# Patient Record
Sex: MALE | Race: WHITE
[De-identification: names, ages, dates, MRNs, and addresses within clinical notes are randomized per-mention and may not be internally consistent; named-entity substitution may affect disease eponyms.]

---

## 2018-08-29 ENCOUNTER — HOSPITAL ENCOUNTER (INPATIENT)
Dept: HOSPITAL 92 - SJJU | Age: 83
LOS: 8 days | Discharge: NURSING, REHAB OR LONG-TERM CARE | DRG: 467 | End: 2018-09-06
Attending: ORTHOPAEDIC SURGERY | Admitting: ORTHOPAEDIC SURGERY
Payer: MEDICARE

## 2018-08-29 VITALS — BODY MASS INDEX: 25.7 KG/M2

## 2018-08-29 DIAGNOSIS — N17.9: ICD-10-CM

## 2018-08-29 DIAGNOSIS — I48.2: ICD-10-CM

## 2018-08-29 DIAGNOSIS — D50.0: ICD-10-CM

## 2018-08-29 DIAGNOSIS — K59.00: ICD-10-CM

## 2018-08-29 DIAGNOSIS — M10.9: ICD-10-CM

## 2018-08-29 DIAGNOSIS — M00.9: ICD-10-CM

## 2018-08-29 DIAGNOSIS — N18.9: ICD-10-CM

## 2018-08-29 DIAGNOSIS — T84.53XA: Primary | ICD-10-CM

## 2018-08-29 DIAGNOSIS — I12.9: ICD-10-CM

## 2018-08-29 DIAGNOSIS — B95.5: ICD-10-CM

## 2018-08-29 DIAGNOSIS — E87.1: ICD-10-CM

## 2018-08-29 LAB
ANION GAP SERPL CALC-SCNC: 14 MMOL/L (ref 10–20)
ANISOCYTOSIS BLD QL SMEAR: (no result) (100X)
APTT PPP: 44.4 SEC (ref 22.9–36.1)
BUN SERPL-MCNC: 20 MG/DL (ref 8.4–25.7)
CALCIUM SERPL-MCNC: 9 MG/DL (ref 7.8–10.44)
CHLORIDE SERPL-SCNC: 100 MMOL/L (ref 98–107)
CO2 SERPL-SCNC: 26 MMOL/L (ref 23–31)
CREAT CL PREDICTED SERPL C-G-VRATE: 77 ML/MIN (ref 70–130)
CRYSTAL-AUWI FLAG: 1 (ref 0–15)
GLUCOSE SERPL-MCNC: 113 MG/DL (ref 83–110)
HEV IGM SER QL: 2 (ref 0–7.99)
HGB BLD-MCNC: 11.4 G/DL (ref 14–18)
HYALINE CASTS #/AREA URNS LPF: (no result) LPF
INR PPP: 1.9
MCH RBC QN AUTO: 29.6 PG (ref 27–31)
MCV RBC AUTO: 90.5 FL (ref 78–98)
MDIFF COMPLETE?: YES
NEUTROPHILS NFR FLD: 91 %
PATHC CAST-AUWI FLAG: 0.58 (ref 0–2.49)
PLATELET # BLD AUTO: 354 THOU/UL (ref 130–400)
PLATELET BLD QL SMEAR: (no result)
POTASSIUM SERPL-SCNC: 4.1 MMOL/L (ref 3.5–5.1)
PROTHROMBIN TIME: 21.6 SEC (ref 12–14.7)
RBC # BLD AUTO: 3.84 MILL/UL (ref 4.7–6.1)
RBC # FLD AUTO: (no result) /CUMM
RBC BACKGROUND COUNT: 0
RBC UR QL AUTO: (no result) HPF (ref 0–3)
SODIUM SERPL-SCNC: 136 MMOL/L (ref 136–145)
SP GR UR STRIP: 1.02 (ref 1–1.04)
SPERM-AUWI FLAG: 0 (ref 0–9.9)
WBC # BLD AUTO: 13.1 THOU/UL (ref 4.8–10.8)
WBC # FLD AUTO: (no result) /CUMM
WBC BACKGROUND COUNT: 0.01
WBC UR QL AUTO: (no result) HPF (ref 0–3)
YEAST-AUWI FLAG: 0 (ref 0–25)

## 2018-08-29 PROCEDURE — 93005 ELECTROCARDIOGRAM TRACING: CPT

## 2018-08-29 PROCEDURE — 86140 C-REACTIVE PROTEIN: CPT

## 2018-08-29 PROCEDURE — 87070 CULTURE OTHR SPECIMN AEROBIC: CPT

## 2018-08-29 PROCEDURE — 87077 CULTURE AEROBIC IDENTIFY: CPT

## 2018-08-29 PROCEDURE — C1751 CATH, INF, PER/CENT/MIDLINE: HCPCS

## 2018-08-29 PROCEDURE — 85060 BLOOD SMEAR INTERPRETATION: CPT

## 2018-08-29 PROCEDURE — 93010 ELECTROCARDIOGRAM REPORT: CPT

## 2018-08-29 PROCEDURE — 89051 BODY FLUID CELL COUNT: CPT

## 2018-08-29 PROCEDURE — A4216 STERILE WATER/SALINE, 10 ML: HCPCS

## 2018-08-29 PROCEDURE — 81001 URINALYSIS AUTO W/SCOPE: CPT

## 2018-08-29 PROCEDURE — 85025 COMPLETE CBC W/AUTO DIFF WBC: CPT

## 2018-08-29 PROCEDURE — C1776 JOINT DEVICE (IMPLANTABLE): HCPCS

## 2018-08-29 PROCEDURE — 36569 INSJ PICC 5 YR+ W/O IMAGING: CPT

## 2018-08-29 PROCEDURE — 36415 COLL VENOUS BLD VENIPUNCTURE: CPT

## 2018-08-29 PROCEDURE — 87186 SC STD MICRODIL/AGAR DIL: CPT

## 2018-08-29 PROCEDURE — 85730 THROMBOPLASTIN TIME PARTIAL: CPT

## 2018-08-29 PROCEDURE — 80048 BASIC METABOLIC PNL TOTAL CA: CPT

## 2018-08-29 PROCEDURE — 71046 X-RAY EXAM CHEST 2 VIEWS: CPT

## 2018-08-29 PROCEDURE — 76770 US EXAM ABDO BACK WALL COMP: CPT

## 2018-08-29 PROCEDURE — C1713 ANCHOR/SCREW BN/BN,TIS/BN: HCPCS

## 2018-08-29 PROCEDURE — 87205 SMEAR GRAM STAIN: CPT

## 2018-08-29 PROCEDURE — 85610 PROTHROMBIN TIME: CPT

## 2018-08-29 PROCEDURE — 80202 ASSAY OF VANCOMYCIN: CPT

## 2018-08-29 RX ADMIN — DOCUSATE CALCIUM SCH MG: 240 CAPSULE, LIQUID FILLED ORAL at 20:26

## 2018-08-29 RX ADMIN — HYDROCODONE BITARTRATE AND ACETAMINOPHEN PRN TAB: 10; 325 TABLET ORAL at 20:26

## 2018-08-29 RX ADMIN — CEFTRIAXONE SCH MLS: 2 INJECTION, POWDER, FOR SOLUTION INTRAMUSCULAR; INTRAVENOUS at 21:09

## 2018-08-29 NOTE — RAD
CHEST TWO VIEWS:

8/29/18

 

HISTORY: 

Preop. 

 

FINDINGS:  

Cardiac silhouette and pulmonary vasculature are unremarkable. Mediastinum is midline with aortic taylor
cification. Lungs are hyperinflated with flattening of each hemidiaphragm. No lobar consolidation, pn
eumothorax or pleural fluid. Marked elevation of the right hemidiaphragm with osteoarthritic type [place]
nges at the acromiohumeral joint. Ossification of the anterior longitudinal ligament of the thoracic 
spine is consistent with diffuse idiopathic skeletal hyperostosis. 

 

IMPRESSION:  

Atherosclerosis. 

 

COPD.

 

Chronic right rotator cuff tear. 

 

 

 

POS: [facility]

## 2018-08-30 RX ADMIN — DOCUSATE CALCIUM SCH MG: 240 CAPSULE, LIQUID FILLED ORAL at 20:10

## 2018-08-30 RX ADMIN — VANCOMYCIN HYDROCHLORIDE SCH MLS: 1 INJECTION, POWDER, LYOPHILIZED, FOR SOLUTION INTRAVENOUS at 18:36

## 2018-08-30 RX ADMIN — CEFTRIAXONE SCH MLS: 2 INJECTION, POWDER, FOR SOLUTION INTRAMUSCULAR; INTRAVENOUS at 20:10

## 2018-08-30 RX ADMIN — HYDROCODONE BITARTRATE AND ACETAMINOPHEN PRN TAB: 10; 325 TABLET ORAL at 20:08

## 2018-08-30 RX ADMIN — HYDROCODONE BITARTRATE AND ACETAMINOPHEN PRN TAB: 10; 325 TABLET ORAL at 11:26

## 2018-08-30 RX ADMIN — Medication SCH MG: at 08:42

## 2018-08-30 RX ADMIN — DOCUSATE CALCIUM SCH MG: 240 CAPSULE, LIQUID FILLED ORAL at 08:41

## 2018-08-30 RX ADMIN — Medication SCH: at 20:11

## 2018-08-30 RX ADMIN — Medication SCH MG: at 20:07

## 2018-08-30 NOTE — PDOC.PN
- Subjective


Encounter Start Date: 08/30/18


Encounter Start Time: 15:21


Subjective: IM team consulted for medical management


-: pt reports that he fell & has worse Knee pain in R knee





- Objective


MAR Reviewed: Yes


Vital Signs & Weight: 


 Vital Signs (12 hours)











  Temp Pulse Resp BP BP Pulse Ox


 


 08/30/18 13:05  98.3 F  70  16   102/60  98


 


 08/30/18 08:42  98.6 F  84  18    98


 


 08/30/18 08:41   84   123/71  


 


 08/30/18 07:10  98.6 F  84  16   123/71  98


 


 08/30/18 04:05  97.9 F  96  16   118/67  96








 Weight











Weight                         200 lb














I&O: 


 











 08/29/18 08/30/18 08/31/18





 06:59 06:59 06:59


 


Intake Total  1040 


 


Output Total  500 


 


Balance  540 











Result Diagrams: 


 08/29/18 18:17





 08/29/18 18:17


Additional Labs: 





 Laboratory Tests











  08/30/18





  07:32


 


C-Reactive Protein  24.63 H














Phys Exam





- Physical Examination


Constitutional: NAD


HEENT: PERRLA, moist MMs, sclera anicteric, oral pharynx no lesions


Neck: no nodes, no JVD, supple, full ROM


Respiratory: no wheezing, no rales, no rhonchi, clear to auscultation bilateral


Cardiovascular: RRR, no significant murmur, no rub


Gastrointestinal: soft, non-tender, no distention, positive bowel sounds


Musculoskeletal: no edema, pulses present


R knee sweling


Neurological: non-focal, normal sensation, moves all 4 limbs


Psychiatric: normal affect, A&O x 3


Skin: no rash





Dx/Plan


(1) Knee pain


Code(s): M25.569 - PAIN IN UNSPECIFIED KNEE   Status: Acute   


Qualifiers: 


   Laterality: right 


Comment: Suspecting septic arthritis   





(2) A-fib


Code(s): I48.91 - UNSPECIFIED ATRIAL FIBRILLATION   Status: Chronic   


Qualifiers: 


   Atrial fibrillation type: paroxysmal   Qualified Code(s): I48.0 - Paroxysmal 

atrial fibrillation   





(3) HTN (hypertension)


Code(s): I10 - ESSENTIAL (PRIMARY) HYPERTENSION   Status: Chronic   





(4) Gout


Code(s): M10.9 - GOUT, UNSPECIFIED   Status: Chronic   





- Plan


continue antibiotics, DVT proph w/SCDs


Abx per ID. on Vanco and rocephin.Synovial fluid Cx sent.follow 


-: BP meds resterted


-: hold eliquis for Sx tomorrow.restart as soon as possible


-: add PRN meds.


-: Im team will follow. Cont NS. am labs





* .








Review of Systems





- Review of Systems


Constitutional: negative: fever, chills, sweats, weakness, malaise, other


ENT: negative: Ear Pain, Ear Discharge, Nose Pain, Nose Discharge, Nose 

Congestion, Mouth Pain, Mouth Swelling, Throat Pain, Throat Swelling, Other


Respiratory: negative: Cough, Dry, Shortness of Breath, Hemoptysis, SOB with 

Excertion, Pleuritic Pain, Sputum, Wheezing


Cardiovascular: negative: chest pain, palpitations, orthopnea, paroxysmal 

nocturnal dyspnea, edema, light headedness, other


Gastrointestinal: negative: Nausea, Vomiting, Abdominal Pain, Diarrhea, 

Constipation, Melena, Hematochezia, Other


Genitourinary: negative: Dysuria, Frequency, Incontinence, Hematuria, Retention

, Other


Musculoskeletal: Other (Knee pain).  negative: Neck Pain, Shoulder Pain, Arm 

Pain, Back Pain, Hand Pain, Leg Pain, Foot Pain


Skin: negative: Rash, Lesions, Alec, Bruising, Other


Neurological: negative: Weakness, Numbness, Incoordination, Change in Speech, 

Confusion, Seizures, Other





- Medications/Allergies


Allergies/Adverse Reactions: 


 Allergies











Allergy/AdvReac Type Severity Reaction Status Date / Time


 


No Known Allergies Allergy   Verified 08/29/18 21:16











Medications: 


 Current Medications





Hydrocodone Bitart/Acetaminophen (Norco 10/325)  1 tab PO Q6H PRN


   PRN Reason: Mild-Moderate Pain (1-5)


   Last Admin: 08/29/18 20:26 Dose:  1 tab


Hydrocodone Bitart/Acetaminophen (Norco 10/325)  2 tab PO Q6H PRN


   PRN Reason: Moderate to Severe Pain (6-10)


   Last Admin: 08/30/18 11:26 Dose:  2 tab


Allopurinol (Zyloprim)  300 mg PO HS [facility]


Amlodipine Besylate (Norvasc)  10 mg PO DAILY [facility]


Ascorbic Acid (Vitamin C)  1,000 mg PO BID [facility]


   Last Admin: 08/30/18 08:42 Dose:  1,000 mg


Atorvastatin Calcium (Lipitor)  40 mg PO HS [facility]


Colchicine (Colcrys)  0.6 mg PO DAILY [facility]


Docusate Calcium (Surfak)  240 mg PO BID [facility]


   Last Admin: 08/30/18 08:41 Dose:  240 mg


Enalapril Maleate (Vasotec)  20 mg PO HS [facility]


Fish Oil (Fish Oil)  1,000 mg PO BID [facility]


   Last Admin: 08/30/18 08:41 Dose:  1,000 mg


Sodium Chloride (Normal Saline 0.9%)  1,000 mls @ 50 mls/hr IV .Q20H [facility]


   Last Admin: 08/29/18 18:29 Dose:  1,000 mls


Ceftriaxone Sodium 2 gm/ (Sodium Chloride)  100 mls @ 200 mls/hr IVPB Q24HR@

2000 [facility]


   Last Admin: 08/29/18 21:09 Dose:  100 mls


Pantoprazole Sodium (Protonix)  40 mg PO DAILY [facility]


Sodium Chloride (Flush - Normal Saline)  10 ml IVF Q12HR [facility]


Sodium Chloride (Flush - Normal Saline)  10 ml IVF PRN PRN


   PRN Reason: Saline Flush


Tamsulosin HCl (Flomax)  0.4 mg PO DAILY [facility]

## 2018-08-30 NOTE — CON
DATE OF CONSULTATION:  08/30/2018

 

REASON FOR CONSULTATION:  Right total knee replacement infection.

 

HISTORY OF PRESENT ILLNESS:  An 85-year-old who has a history of chronic atrial 
fibrillation, hypertension as well as gout, and previous right knee replacement 
many years ago by Dr. Boone.  Reportedly, more than 20 years ago, he developed 
progressive pain in the area and Dr. Boone did arthrocentesis.  This yielded 
seropurulent fluid with gram positive cocci in clusters.  Full identification 
and susceptibilities are pending.  The patient is going to have removal of the 
implant tomorrow and we will have a spacer, probably functional spacer 
temporarily placed.  He right now denies any headaches, visual symptoms, sore 
throat, odynophagia, excisional dyspnea or chest pain, no back pain, no 
abdominal pain or diarrhea.  He is voiding in the urinal.  His neuro status is 
stable.

 

PAST MEDICAL HISTORY:  Includes hypertension, gout, atrial fibrillation.

 

MEDICATIONS:  Allopurinol, hydrocodone, Norvasc, ascorbic acid, colchicine, 
enalapril, ceftriaxone, tamsulosin and currently in addition, he is on tramadol.

 

ALLERGIES:  None.

 

SOCIAL HISTORY:  Never a smoker.

 

FAMILY HISTORY:  Noncontributory.

 

PHYSICAL EXAMINATION:

VITAL SIGNS:  Temperature max 98.6, BP 95/56, pulse 72, respirations 20, O2 sat 
98%.

SKIN:  Shows the right knee with no evidence of erythema or drainage.  
Peripheral IV access.  Does not have a Reddy catheter.

HEENT:  Noncontributory.

NECK:  Supple.

LUNGS:  With symmetric clear breath sounds.

HEART:  S1, S2, irregular rate without murmurs.

ABDOMEN:  Soft, not distended.

GENITOURINARY:  No genital abnormalities.

EXTREMITIES:  Right knee is somewhat swollen compared with the left side, some 
limitation range of motion.  Pulses 1+ in dorsalis pedis.

NEUROLOGIC:  Plantar responses are flexure.  Cognitive function appears to be 
intact.

 

LABORATORY DATA:  White cell count 13.1, hemoglobin 11.4, platelets 354,000 
with 86% neutrophils.  INR 1.9.  Chemistry with a creatinine 0.9.  Synovial 
fluid with predominance of mature neutrophils.  Urinalysis with 0-3 wbc's.  The 
culture aspirate with gram positive cocci in clusters.

 

IMAGING:  Chest x-ray, atherosclerosis and COPD.

 

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 
either coagulase-negative Staphylococcus or Staphylococcus aureus including the 
possibility of methicillin-resistant staphylococcus aureus.

 

The patient will have the implant removed tomorrow and then after that a spacer 
will be placed.  The decision to revise or keep the spacer which is likely to 
be one of those functional spacers for a long period of time.  We will wait for 
the final identification and susceptibility profile to determine the long-term 
antimicrobial therapy.  We will add vancomycin.  Wait for cultures and then 
PICC line placement.  Treat for a long time about 42 days.  After that 
transition to oral antimicrobials, duration to be determined by the subsequent 
course of management, specifically if there is a revision surgery or not.  
There is not, then would continue it indefinitely with suppressive therapy.

 

MTDD

## 2018-08-31 LAB
ANION GAP SERPL CALC-SCNC: 9 MMOL/L (ref 10–20)
BASOPHILS # BLD AUTO: 0.1 THOU/UL (ref 0–0.2)
BASOPHILS NFR BLD AUTO: 0.6 % (ref 0–1)
BUN SERPL-MCNC: 19 MG/DL (ref 8.4–25.7)
CALCIUM SERPL-MCNC: 8.3 MG/DL (ref 7.8–10.44)
CHLORIDE SERPL-SCNC: 103 MMOL/L (ref 98–107)
CO2 SERPL-SCNC: 25 MMOL/L (ref 23–31)
CREAT CL PREDICTED SERPL C-G-VRATE: 87 ML/MIN (ref 70–130)
EOSINOPHIL # BLD AUTO: 0.2 THOU/UL (ref 0–0.7)
EOSINOPHIL NFR BLD AUTO: 1.8 % (ref 0–10)
GLUCOSE SERPL-MCNC: 96 MG/DL (ref 83–110)
HGB BLD-MCNC: 9.4 G/DL (ref 14–18)
LYMPHOCYTES # BLD: 1.5 THOU/UL (ref 1.2–3.4)
LYMPHOCYTES NFR BLD AUTO: 16.5 % (ref 21–51)
MCH RBC QN AUTO: 29.5 PG (ref 27–31)
MCV RBC AUTO: 91.5 FL (ref 78–98)
MONOCYTES # BLD AUTO: 1.1 THOU/UL (ref 0.11–0.59)
MONOCYTES NFR BLD AUTO: 12.1 % (ref 0–10)
NEUTROPHILS # BLD AUTO: 6.4 THOU/UL (ref 1.4–6.5)
NEUTROPHILS NFR BLD AUTO: 68.9 % (ref 42–75)
PLATELET # BLD AUTO: 302 THOU/UL (ref 130–400)
POTASSIUM SERPL-SCNC: 3.7 MMOL/L (ref 3.5–5.1)
RBC # BLD AUTO: 3.2 MILL/UL (ref 4.7–6.1)
SODIUM SERPL-SCNC: 133 MMOL/L (ref 136–145)
WBC # BLD AUTO: 9.3 THOU/UL (ref 4.8–10.8)

## 2018-08-31 PROCEDURE — 0SWC0JC REVISION OF SYNTHETIC SUBSTITUTE IN RIGHT KNEE JOINT, PATELLAR SURFACE, OPEN APPROACH: ICD-10-PCS | Performed by: ORTHOPAEDIC SURGERY

## 2018-08-31 PROCEDURE — 02HV33Z INSERTION OF INFUSION DEVICE INTO SUPERIOR VENA CAVA, PERCUTANEOUS APPROACH: ICD-10-PCS | Performed by: RADIOLOGY

## 2018-08-31 PROCEDURE — B548ZZA ULTRASONOGRAPHY OF SUPERIOR VENA CAVA, GUIDANCE: ICD-10-PCS | Performed by: RADIOLOGY

## 2018-08-31 PROCEDURE — 0SWV0JZ REVISION OF SYNTHETIC SUBSTITUTE IN RIGHT KNEE JOINT, TIBIAL SURFACE, OPEN APPROACH: ICD-10-PCS | Performed by: ORTHOPAEDIC SURGERY

## 2018-08-31 RX ADMIN — HEPARIN SODIUM SCH UNITS: 5000 INJECTION, SOLUTION INTRAVENOUS; SUBCUTANEOUS at 20:34

## 2018-08-31 RX ADMIN — VANCOMYCIN HYDROCHLORIDE SCH MLS: 1 INJECTION, POWDER, LYOPHILIZED, FOR SOLUTION INTRAVENOUS at 17:40

## 2018-08-31 RX ADMIN — Medication SCH: at 08:27

## 2018-08-31 RX ADMIN — CEFTRIAXONE SCH MLS: 2 INJECTION, POWDER, FOR SOLUTION INTRAMUSCULAR; INTRAVENOUS at 19:30

## 2018-08-31 RX ADMIN — Medication SCH MG: at 20:34

## 2018-08-31 RX ADMIN — DOCUSATE CALCIUM SCH: 240 CAPSULE, LIQUID FILLED ORAL at 08:27

## 2018-08-31 RX ADMIN — Medication SCH: at 20:35

## 2018-08-31 RX ADMIN — HYDROCODONE BITARTRATE AND ACETAMINOPHEN PRN TAB: 10; 325 TABLET ORAL at 19:30

## 2018-08-31 RX ADMIN — DOCUSATE CALCIUM SCH MG: 240 CAPSULE, LIQUID FILLED ORAL at 20:34

## 2018-08-31 RX ADMIN — HYDROCODONE BITARTRATE AND ACETAMINOPHEN PRN TAB: 10; 325 TABLET ORAL at 13:37

## 2018-08-31 RX ADMIN — VANCOMYCIN HYDROCHLORIDE SCH MLS: 1 INJECTION, POWDER, LYOPHILIZED, FOR SOLUTION INTRAVENOUS at 05:58

## 2018-08-31 NOTE — PRG
DATE OF SERVICE:  08/31/2018

 

SUBJECTIVE:  Mr. Rothman is still having pain in the right knee area, did not have a PICC line placed 
yet.  No respiratory symptoms, no abdominal pain.  Voiding in the urinal.

 

OBJECTIVE:

VITAL SIGNS:  T-max 98.2, blood pressure 136/81, pulse 90, respirations 18.

GENERAL:  Appears in no distress.

LUNGS:  Clear.

CARDIOVASCULAR:  S1 and S2, regular rate.

ABDOMEN:  Soft.

 

LABORATORY DATA:  White cell count down to 9.3, hemoglobin 9.4, platelets 302.  Creatinine 0.8.  Micr
obiology with alpha-hemolytic streptococcus.  The operative note was reviewed.  The old incision was 
opened.  Arthrotomy completed.  Synovectomy completed.  Implants removed.  Patellar removed and irrig
ated.  Cement impregnated with antimicrobials was placed and new spacers with implants cemented.

 

ASSESSMENT AND DISCUSSION:  Total knee replacement many years ago, now with alpha-hemolytic strep inf
ection.  Continue Rocephin until final culture results are available, then discharge planning on IV R
ocephin for 6 weeks and then transition to oral Keflex for indefinite suppression.  I would treat for
 3 months with 500 three times a day and then after that twice daily Keflex suppressive therapy.  Fol
low up C-reactive protein.  The patient may be eligible for a revision if the current implant/spacer 
is not associated with adequate functional capacity.

## 2018-08-31 NOTE — EKG
Test Reason : PREOP

Blood Pressure : ***/*** mmHG

Vent. Rate : 083 BPM     Atrial Rate : 098 BPM

   P-R Int : 000 ms          QRS Dur : 084 ms

    QT Int : 340 ms       P-R-T Axes : 000 081 033 degrees

   QTc Int : 399 ms

 

Atrial fibrillation

RSR' or QR pattern in V1 suggests right ventricular conduction delay

Abnormal ECG

 

Confirmed by TOVA GRANDE ([id number]) on 8/31/2018 12:18:30 PM

 

Referred By:  BILL           Confirmed By:TOVA GRANDE

## 2018-08-31 NOTE — PDOC.PN
- Subjective


Encounter Start Date: 08/31/18


Encounter Start Time: 15:53


Subjective: s/p Knee Sx for Septic arthritis w placement of cement w ABx


-: denies any pain/fever/chills/SOB


-: care discussed w family at bedside





- Objective


MAR Reviewed: Yes


Vital Signs & Weight: 


 Vital Signs (12 hours)











  Temp Pulse Resp BP Pulse Ox


 


 08/31/18 11:30  97.4 F L  90  18  136/81  95


 


 08/31/18 08:26   79   


 


 08/31/18 04:11  97.5 F L  79  18  110/62  96








 Weight











Weight                         200 lb














I&O: 


 











 08/30/18 08/31/18 09/01/18





 06:59 06:59 06:59


 


Intake Total 1040  


 


Output Total 500  


 


Balance 540  











Result Diagrams: 


 08/31/18 04:02





 08/31/18 04:02


Additional Labs: 





Microbiology





08/29/18 18:00   Knee aspirate - Right   Bacterial Culture - Preliminary


                              Alpha-Hemolytic Streptococcus


08/29/18 18:00   Knee aspirate - Right   Bacterial Culture - Preliminary











Phys Exam





- Physical Examination


Constitutional: NAD


HEENT: PERRLA, moist MMs, sclera anicteric, oral pharynx no lesions


Neck: no nodes, no JVD, supple, full ROM


Respiratory: no wheezing, no rales, no rhonchi, clear to auscultation bilateral


Cardiovascular: RRR, no significant murmur, no rub, gallop, irregular


Gastrointestinal: soft, non-tender, no distention, positive bowel sounds


Musculoskeletal: no edema, pulses present


Neurological: non-focal, normal sensation, moves all 4 limbs


Psychiatric: normal affect, A&O x 3


Skin: no rash





Dx/Plan


(1) Knee pain


Code(s): M25.569 - PAIN IN UNSPECIFIED KNEE   Status: Acute   


Qualifiers: 


   Laterality: right 


Comment: Suspecting septic arthritis.s/p Sx w Removal of hardware 8/31/18   





(2) A-fib


Code(s): I48.91 - UNSPECIFIED ATRIAL FIBRILLATION   Status: Chronic   


Qualifiers: 


   Atrial fibrillation type: paroxysmal   Qualified Code(s): I48.0 - Paroxysmal 

atrial fibrillation   





(3) HTN (hypertension)


Code(s): I10 - ESSENTIAL (PRIMARY) HYPERTENSION   Status: Chronic   





(4) Gout


Code(s): M10.9 - GOUT, UNSPECIFIED   Status: Chronic   





- Plan


plan discussed w/ family, continue antibiotics, PT/OT, out of bed/ambulate, DVT 

proph w/SCDs


Cont ABx. follow Cx. Streptococcus. 


-: pICC line placed for protracted course of IV ABx


-: rehab next week.


-: Check H/h in am. Home meds as below


-: AC on hold. restart as soon as possible whe cleared by Ortho





* .








Review of Systems





- Review of Systems


Constitutional: negative: fever, chills, sweats, weakness, malaise, other


Respiratory: negative: Cough, Dry, Shortness of Breath, Hemoptysis, SOB with 

Excertion, Pleuritic Pain, Sputum, Wheezing


Cardiovascular: negative: chest pain, palpitations, orthopnea, paroxysmal 

nocturnal dyspnea, edema, light headedness, other


Gastrointestinal: negative: Nausea, Vomiting, Abdominal Pain, Diarrhea, 

Constipation, Melena, Hematochezia, Other


Genitourinary: negative: Dysuria, Frequency, Incontinence, Hematuria, Retention

, Other


Musculoskeletal: negative: Neck Pain, Shoulder Pain, Arm Pain, Back Pain, Hand 

Pain, Leg Pain, Foot Pain, Other


Skin: negative: Rash, Lesions, Alec, Bruising, Other


Neurological: negative: Weakness, Numbness, Incoordination, Change in Speech, 

Confusion, Seizures, Other





- Medications/Allergies


Allergies/Adverse Reactions: 


 Allergies











Allergy/AdvReac Type Severity Reaction Status Date / Time


 


No Known Allergies Allergy   Verified 08/29/18 21:16











Medications: 


 Current Medications





Acetaminophen (Tylenol)  650 mg PO Q4H PRN


   PRN Reason: Headache/Fever or Mild Pain


Hydrocodone Bitart/Acetaminophen (Norco 10/325)  1 tab PO Q6H PRN


   PRN Reason: Mild-Moderate Pain (1-5)


   Last Admin: 08/29/18 20:26 Dose:  1 tab


Hydrocodone Bitart/Acetaminophen (Norco 10/325)  2 tab PO Q6H PRN


   PRN Reason: Moderate to Severe Pain (6-10)


   Last Admin: 08/31/18 13:37 Dose:  2 tab


Al Hydroxide/Mg Hydroxide (Maalox)  15 ml PO Q4H PRN


   PRN Reason: Heartburn  or Indigestion


Allopurinol (Zyloprim)  300 mg PO HS [facility]


   Last Admin: 08/30/18 20:10 Dose:  300 mg


Amlodipine Besylate (Norvasc)  10 mg PO DAILY [facility]


   Last Admin: 08/31/18 08:26 Dose:  Not Given


Ascorbic Acid (Vitamin C)  1,000 mg PO BID [facility]


   Last Admin: 08/31/18 08:27 Dose:  Not Given


Atorvastatin Calcium (Lipitor)  40 mg PO [facility]


   Last Admin: 08/30/18 20:10 Dose:  40 mg


Benzonatate (Tessalon)  100 mg PO Q4H PRN


   PRN Reason: Cough


Bisacodyl (Dulcolax)  10 mg PO DAILYPRN PRN


   PRN Reason: Constipation


Calcium Carbonate (Tums)  1,000 mg PO Q4H PRN


   PRN Reason: Heartburn  or Indigestion


Clonidine (Catapres)  0.1 mg PO Q4H PRN


   PRN Reason: Systolic BP > 160


Colchicine (Colcrys)  0.6 mg PO DAILY [facility]


   Last Admin: 08/31/18 08:27 Dose:  Not Given


Docusate Calcium (Surfak)  240 mg PO BID [facility]


   Last Admin: 08/31/18 08:27 Dose:  Not Given


Enalapril Maleate (Vasotec)  20 mg PO [facility]


   Last Admin: 08/30/18 20:09 Dose:  20 mg


Fish Oil (Fish Oil)  1,000 mg PO BID [facility]


   Last Admin: 08/31/18 08:27 Dose:  Not Given


Guaifenesin (Robitussin Sf)  200 mg PO Q4H PRN


   PRN Reason: Cough


Hydralazine HCl (Apresoline)  10 mg SLOW IVP Q4H PRN


   PRN Reason: Systolic BP > 170


Sodium Chloride (Normal Saline 0.9%)  1,000 mls @ 50 mls/hr IV .Q20H [facility]


   Last Admin: 08/30/18 17:10 Dose:  1,000 mls


Ceftriaxone Sodium 2 gm/ (Sodium Chloride)  100 mls @ 200 mls/hr IVPB Q24HR@

2000 [facility]


   Last Admin: 08/30/18 20:10 Dose:  100 mls


Vancomycin HCl 1.25 gm/ Sodium (Chloride)  250 mls @ 166.667 mls/hr IVPB 0600,

1800 [facility]


   Last Admin: 08/31/18 05:58 Dose:  250 mls


Lactulose (Lactulose)  10 gm PO DAILYPRN PRN


   PRN Reason: Constipation


Loratadine (Claritin)  10 mg PO DAILYPRN PRN


   PRN Reason: Sinus Symptoms


Miscellaneous Medication (Pharmacy To Dose)  1 each IVPB PRN PRN


   PRN Reason: Pharmacy to dose


Nitroglycerin (Nitrostat)  0.4 mg SL Q5MIN PRN


   PRN Reason: Chest Pain


Ondansetron HCl (Zofran)  4 mg IVP Q6H PRN


   PRN Reason: Nausea/Vomiting


Pantoprazole Sodium (Protonix)  40 mg PO DAILY [facility]


   Last Admin: 08/31/18 08:27 Dose:  Not Given


Senna (Senokot)  2 tab PO HSPRN PRN


   PRN Reason: Constipation


Sodium Chloride (Flush - Normal Saline)  10 ml IVF Q12HR [facility]


   Last Admin: 08/31/18 08:27 Dose:  Not Given


Sodium Chloride (Flush - Normal Saline)  10 ml IVF PRN PRN


   PRN Reason: Saline Flush


Tamsulosin HCl (Flomax)  0.4 mg PO DAILY [facility]


   Last Admin: 08/31/18 08:27 Dose:  Not Given


Tramadol HCl (Ultram)  50 mg PO Q4H PRN


   PRN Reason: Moderate Pain (4-6)

## 2018-08-31 NOTE — OP
DATE OF PROCEDURE:  08/31/2018

 

PREOPERATIVE DIAGNOSIS:  Infected total knee.

 

POSTOPERATIVE DIAGNOSIS:  Infected total knee.

 

PROCEDURE:  Revision of right knee for infection.

 

SURGEON:  Pravin Boone M.D.

 

ASSISTANT:  Mekhi Diaz PA-C.

 

BLOOD LOSS:  Minimal.

 

TOURNIQUET TIME:  63.

 

SPECIMEN:  None.

 

DRAINS:  None.

 

COMPLICATIONS:  None.

 

DESCRIPTION OF PROCEDURE:  The patient was taken to operating room where general anesthesia was induc
ed.  Right leg was prepped and draped in the usual sterile fashion.  He was already on Rocephin and v
ancomycin.  The old incision was opened.  I created flaps.  I performed medial parapatellar arthrotom
y.  I performed a complete synovectomy and then removed implants with some difficulty and great care 
to preserve bone stock.  Pulsatile lavage irrigation was performed.  I recut the tibia and trial 16 a
ll poly tibia with _____ pretty good stability and full extension.  I removed the patella and irrigat
ed this as well.  I created tobramycin and vancomycin treated cement.  This was mixed and I had cemen
yusra the implants with somewhat sloppy technique to make them easier to remove enough cement and then 
about 10 degrees of flexion.  Extraneous cement was removed.  Pulsatile lavage irrigation was perform
ed again.  A total of about 8 liters of irrigation was used.  Remnants include #2 Vicryl and #2 Quill
, subcu closed with 0 Quill, skin was closed with 2-0 Prolene.  Sterile dressings applied.  The patie
nt placed in knee immobilizer.

## 2018-08-31 NOTE — SPC
ULTRASOUND GUIDED LEFT UPPER EXTREMITY PICC LINE PLACEMENT:

 

Date:  08/31/18 

 

HISTORY:  

Right knee infection. Patient needs long-term IV antibiotics. 

 

FLUOROSCOPY:

Total fluoroscopy time was 0.1 minutes with total dose of 416 mGy*cm^2. 

 

TECHNIQUE:  

After informed consent was obtained, the patient was placed on the angiography table in the supine po
sition. The left upper extremity was meticulously prepped and draped in the usual sterile fashion. An
 appropriate access site was determined with ultrasound guidance. The skin and subcutaneous tissues w
ere infiltrated with buffered 1% lidocaine for local anesthesia. A small skin incision was made. A 5 
Kenyan peel-away sheath was placed. 

 

The catheter was measured and cut to the appropriate length. The catheter was placed over the guidewi
re with the tip positioned overlying the distal SVC. The guidewire and peel-away sheath were removed.
 The catheter was accessed and aspirated/flushed easily. The catheter was secured to the skin utilizi
ng a StatLock device, and a dry, sterile dressing was placed. 

 

The patient tolerated the procedure well and without immediate complication. 

 

FINDINGS:

Technically successful placement of a single lumen 5 Kenyan 47 cm PICC line via the left basilic vein
. The tip of the catheter overlies the distal SVC. 

 

IMPRESSION: 

Technically successful left upper extremity PICC line placement.  

 

POS: [facility]

## 2018-09-01 LAB
ANION GAP SERPL CALC-SCNC: 10 MMOL/L (ref 10–20)
BASOPHILS # BLD AUTO: 0 THOU/UL (ref 0–0.2)
BASOPHILS NFR BLD AUTO: 0.2 % (ref 0–1)
BUN SERPL-MCNC: 16 MG/DL (ref 8.4–25.7)
CALCIUM SERPL-MCNC: 8 MG/DL (ref 7.8–10.44)
CHLORIDE SERPL-SCNC: 104 MMOL/L (ref 98–107)
CO2 SERPL-SCNC: 23 MMOL/L (ref 23–31)
CREAT CL PREDICTED SERPL C-G-VRATE: 91 ML/MIN (ref 70–130)
EOSINOPHIL # BLD AUTO: 0.1 THOU/UL (ref 0–0.7)
EOSINOPHIL NFR BLD AUTO: 0.7 % (ref 0–10)
GLUCOSE SERPL-MCNC: 128 MG/DL (ref 83–110)
HGB BLD-MCNC: 9.6 G/DL (ref 14–18)
LYMPHOCYTES # BLD: 1 THOU/UL (ref 1.2–3.4)
LYMPHOCYTES NFR BLD AUTO: 9.1 % (ref 21–51)
MCH RBC QN AUTO: 30 PG (ref 27–31)
MCV RBC AUTO: 91.8 FL (ref 78–98)
MONOCYTES # BLD AUTO: 1.2 THOU/UL (ref 0.11–0.59)
MONOCYTES NFR BLD AUTO: 10.8 % (ref 0–10)
NEUTROPHILS # BLD AUTO: 8.7 THOU/UL (ref 1.4–6.5)
NEUTROPHILS NFR BLD AUTO: 79.2 % (ref 42–75)
PLATELET # BLD AUTO: 319 THOU/UL (ref 130–400)
POTASSIUM SERPL-SCNC: 4.3 MMOL/L (ref 3.5–5.1)
RBC # BLD AUTO: 3.18 MILL/UL (ref 4.7–6.1)
SODIUM SERPL-SCNC: 133 MMOL/L (ref 136–145)
VANCOMYCIN TROUGH SERPL-MCNC: 20.5 UG/ML
WBC # BLD AUTO: 11 THOU/UL (ref 4.8–10.8)

## 2018-09-01 RX ADMIN — HEPARIN SODIUM SCH UNITS: 5000 INJECTION, SOLUTION INTRAVENOUS; SUBCUTANEOUS at 07:48

## 2018-09-01 RX ADMIN — DOCUSATE CALCIUM SCH MG: 240 CAPSULE, LIQUID FILLED ORAL at 20:42

## 2018-09-01 RX ADMIN — Medication SCH MG: at 07:48

## 2018-09-01 RX ADMIN — HYDROCODONE BITARTRATE AND ACETAMINOPHEN PRN TAB: 10; 325 TABLET ORAL at 16:04

## 2018-09-01 RX ADMIN — CEFTRIAXONE SCH MLS: 2 INJECTION, POWDER, FOR SOLUTION INTRAMUSCULAR; INTRAVENOUS at 20:39

## 2018-09-01 RX ADMIN — Medication SCH: at 22:37

## 2018-09-01 RX ADMIN — VANCOMYCIN HYDROCHLORIDE SCH MLS: 1 INJECTION, POWDER, LYOPHILIZED, FOR SOLUTION INTRAVENOUS at 06:03

## 2018-09-01 RX ADMIN — HYDROCODONE BITARTRATE AND ACETAMINOPHEN PRN TAB: 10; 325 TABLET ORAL at 08:43

## 2018-09-01 RX ADMIN — HEPARIN SODIUM SCH UNITS: 5000 INJECTION, SOLUTION INTRAVENOUS; SUBCUTANEOUS at 20:43

## 2018-09-01 RX ADMIN — DOCUSATE CALCIUM SCH MG: 240 CAPSULE, LIQUID FILLED ORAL at 07:49

## 2018-09-01 RX ADMIN — Medication SCH MG: at 20:41

## 2018-09-01 RX ADMIN — Medication SCH ML: at 07:50

## 2018-09-01 NOTE — PDOC.PN
- Subjective


Encounter Start Date: 09/01/18 (f/u htn)


Encounter Start Time: 17:23


Subjective: Pt denies any complaints or concerns.  No BM in 3 days.  Denies any


-: problems with urination.  





- Objective


Vital Signs & Weight: 


 Vital Signs (12 hours)











  Temp Pulse Resp BP BP Pulse Ox


 


 09/01/18 15:15  97.8 F  86  14   105/62  92 L


 


 09/01/18 12:05  97.8 F  86  16   106/59 L  92 L


 


 09/01/18 08:00  97.8 F  86  14    92 L


 


 09/01/18 07:49   86   128/63  


 


 09/01/18 07:28  97.8 F  86  14   128/83  92 L








 Weight











Weight                         200 lb














I&O: 


 











 08/31/18 09/01/18 09/02/18





 06:59 06:59 06:59


 


Intake Total  780 


 


Output Total  425 


 


Balance  355 











Result Diagrams: 


 09/01/18 05:28





 09/01/18 05:28





Phys Exam





- Physical Examination


Constitutional: NAD


Respiratory: no wheezing, no rales, no rhonchi


Cardiovascular: RRR, no significant murmur


Gastrointestinal: soft, non-tender, no distention, positive bowel sounds


right leg in brace


Neurological: non-focal


Psychiatric: normal affect


Skin: no rash





Dx/Plan


(1) Knee pain


Code(s): M25.569 - PAIN IN UNSPECIFIED KNEE   Status: Acute   


Qualifiers: 


   Laterality: right 


Comment: Suspecting septic arthritis.s/p Sx w Removal of hardware 8/31/18   





(2) A-fib


Code(s): I48.91 - UNSPECIFIED ATRIAL FIBRILLATION   Status: Chronic   


Qualifiers: 


   Atrial fibrillation type: paroxysmal   Qualified Code(s): I48.0 - Paroxysmal 

atrial fibrillation   





(3) Gout


Code(s): M10.9 - GOUT, UNSPECIFIED   Status: Chronic   





(4) HTN (hypertension)


Code(s): I10 - ESSENTIAL (PRIMARY) HYPERTENSION   Status: Chronic   





(5) Hyponatremia


Code(s): E87.1 - HYPO-OSMOLALITY AND HYPONATREMIA   Status: Acute   





- Plan





* Right knee/leg per Ortho


* 


* BP's on lower side especially for age - will reduce to half both the 

enalapril and amlodipine and place hold parameters if sbp less than 130.  Im 

concerned this lower bp is going to cause poor energy or orthostatic sx.  

Return to home dosing as bp's rise


* 


* Hyponatremia - mild, monitor


* 


* Pt has docusate scheduled, add miralax.  Has a few prn meds as well


* 


* resume full anticoagulation for a fib when cleared by ortho


* 


* dvt prophy - heparin


* gi prophy - not indicated


* code status full

## 2018-09-01 NOTE — PDOC.EVN
Event Note





- Event Note


Event Note: 





reviewing notes and VS and pt's systolic pressure in the 90's. Will start IVF 

to run overnight.  Hold parameters placed earlier on anti-htn meds.

## 2018-09-02 LAB
ANION GAP SERPL CALC-SCNC: 8 MMOL/L (ref 10–20)
BUN SERPL-MCNC: 23 MG/DL (ref 8.4–25.7)
CALCIUM SERPL-MCNC: 8 MG/DL (ref 7.8–10.44)
CHLORIDE SERPL-SCNC: 104 MMOL/L (ref 98–107)
CO2 SERPL-SCNC: 25 MMOL/L (ref 23–31)
CREAT CL PREDICTED SERPL C-G-VRATE: 54 ML/MIN (ref 70–130)
GLUCOSE SERPL-MCNC: 121 MG/DL (ref 83–110)
POTASSIUM SERPL-SCNC: 4.1 MMOL/L (ref 3.5–5.1)
SODIUM SERPL-SCNC: 133 MMOL/L (ref 136–145)

## 2018-09-02 RX ADMIN — HYDROCODONE BITARTRATE AND ACETAMINOPHEN PRN TAB: 10; 325 TABLET ORAL at 00:25

## 2018-09-02 RX ADMIN — Medication SCH ML: at 20:08

## 2018-09-02 RX ADMIN — Medication SCH ML: at 09:00

## 2018-09-02 RX ADMIN — HEPARIN SODIUM SCH UNITS: 5000 INJECTION, SOLUTION INTRAVENOUS; SUBCUTANEOUS at 08:42

## 2018-09-02 RX ADMIN — Medication SCH MG: at 08:42

## 2018-09-02 RX ADMIN — CEFTRIAXONE SCH MLS: 2 INJECTION, POWDER, FOR SOLUTION INTRAMUSCULAR; INTRAVENOUS at 20:02

## 2018-09-02 RX ADMIN — HYDROCODONE BITARTRATE AND ACETAMINOPHEN PRN TAB: 10; 325 TABLET ORAL at 11:04

## 2018-09-02 RX ADMIN — DOCUSATE CALCIUM SCH MG: 240 CAPSULE, LIQUID FILLED ORAL at 20:06

## 2018-09-02 RX ADMIN — DOCUSATE CALCIUM SCH MG: 240 CAPSULE, LIQUID FILLED ORAL at 08:42

## 2018-09-02 RX ADMIN — Medication SCH MG: at 20:06

## 2018-09-02 RX ADMIN — HYDROCODONE BITARTRATE AND ACETAMINOPHEN PRN TAB: 10; 325 TABLET ORAL at 18:26

## 2018-09-02 RX ADMIN — HEPARIN SODIUM SCH UNITS: 5000 INJECTION, SOLUTION INTRAVENOUS; SUBCUTANEOUS at 20:07

## 2018-09-02 NOTE — PDOC.PN
- Subjective


Encounter Start Date: 09/02/18 (f/u hypertension)


Encounter Start Time: 09:07


Subjective: Pt denies any complaints this AM - denies cp/sob/n/v/diarrhea/skin 

rash


-: Overnight bp's into the 90's systolic - IVF started





- Objective


Vital Signs & Weight: 


 Vital Signs (12 hours)











  Temp Pulse Resp BP Pulse Ox


 


 09/02/18 07:47  97.8 F  89  14  112/62  100


 


 09/02/18 04:17  98.5 F  97  21 H  137/65  99


 


 09/02/18 00:12  97.7 F  79  17  136/63  100








 Weight











Weight                         200 lb














I&O: 


 











 09/01/18 09/02/18 09/03/18





 06:59 06:59 06:59


 


Intake Total 780 1335 


 


Output Total 425 375 


 


Balance 355 960 











Result Diagrams: 


 09/01/18 05:28





 09/02/18 05:15





Phys Exam





- Physical Examination


Constitutional: NAD


Respiratory: no wheezing, no rales, no rhonchi, clear to auscultation bilateral


Cardiovascular: RRR, no significant murmur


Gastrointestinal: soft, non-tender, no distention, positive bowel sounds


Psychiatric: normal affect


Skin: no rash





Dx/Plan


(1) Knee pain


Code(s): M25.569 - PAIN IN UNSPECIFIED KNEE   Status: Acute   


Qualifiers: 


   Laterality: right 


Comment: Suspecting septic arthritis.s/p Sx w Removal of hardware 8/31/18   





(2) A-fib


Code(s): I48.91 - UNSPECIFIED ATRIAL FIBRILLATION   Status: Chronic   


Qualifiers: 


   Atrial fibrillation type: paroxysmal   Qualified Code(s): I48.0 - Paroxysmal 

atrial fibrillation   





(3) Gout


Code(s): M10.9 - GOUT, UNSPECIFIED   Status: Chronic   





(4) HTN (hypertension)


Code(s): I10 - ESSENTIAL (PRIMARY) HYPERTENSION   Status: Chronic   





(5) Hyponatremia


Code(s): E87.1 - HYPO-OSMOLALITY AND HYPONATREMIA   Status: Acute   





- Plan





* 


* Right knee/leg per Ortho


* 


* BP's low overnight and normal this AM - norvasc d/c.  Will also d/c enalapril 

- add both back as bp's increase.


* 


* Hyponatremia - mild, stable, will continue to monitor


* 


* Pt has docusate scheduled and miralax scheduled and prn meds - no BM since 

admission


* 


* resume full anticoagulation for a fib when cleared by ortho


* 


* dvt prophy - heparin


* gi prophy - not indicated


* code status full





.

## 2018-09-03 LAB
ANION GAP SERPL CALC-SCNC: 9 MMOL/L (ref 10–20)
BASOPHILS # BLD AUTO: 0 THOU/UL (ref 0–0.2)
BASOPHILS NFR BLD AUTO: 0.5 % (ref 0–1)
BUN SERPL-MCNC: 26 MG/DL (ref 8.4–25.7)
CALCIUM SERPL-MCNC: 8.2 MG/DL (ref 7.8–10.44)
CHLORIDE SERPL-SCNC: 105 MMOL/L (ref 98–107)
CO2 SERPL-SCNC: 25 MMOL/L (ref 23–31)
CREAT CL PREDICTED SERPL C-G-VRATE: 49 ML/MIN (ref 70–130)
EOSINOPHIL # BLD AUTO: 0.6 THOU/UL (ref 0–0.7)
EOSINOPHIL NFR BLD AUTO: 7.3 % (ref 0–10)
GLUCOSE SERPL-MCNC: 92 MG/DL (ref 83–110)
HGB BLD-MCNC: 7.5 G/DL (ref 14–18)
LYMPHOCYTES # BLD: 1.3 THOU/UL (ref 1.2–3.4)
LYMPHOCYTES NFR BLD AUTO: 15.6 % (ref 21–51)
MCH RBC QN AUTO: 30.6 PG (ref 27–31)
MCV RBC AUTO: 92.8 FL (ref 78–98)
MONOCYTES # BLD AUTO: 0.7 THOU/UL (ref 0.11–0.59)
MONOCYTES NFR BLD AUTO: 8.8 % (ref 0–10)
NEUTROPHILS # BLD AUTO: 5.5 THOU/UL (ref 1.4–6.5)
NEUTROPHILS NFR BLD AUTO: 67.7 % (ref 42–75)
PLATELET # BLD AUTO: 348 THOU/UL (ref 130–400)
POTASSIUM SERPL-SCNC: 4.2 MMOL/L (ref 3.5–5.1)
RBC # BLD AUTO: 2.44 MILL/UL (ref 4.7–6.1)
SODIUM SERPL-SCNC: 135 MMOL/L (ref 136–145)
WBC # BLD AUTO: 8.2 THOU/UL (ref 4.8–10.8)

## 2018-09-03 RX ADMIN — HEPARIN SODIUM SCH UNITS: 5000 INJECTION, SOLUTION INTRAVENOUS; SUBCUTANEOUS at 09:08

## 2018-09-03 RX ADMIN — Medication SCH MG: at 20:30

## 2018-09-03 RX ADMIN — HYDROCODONE BITARTRATE AND ACETAMINOPHEN PRN TAB: 10; 325 TABLET ORAL at 09:14

## 2018-09-03 RX ADMIN — HYDROCODONE BITARTRATE AND ACETAMINOPHEN PRN TAB: 10; 325 TABLET ORAL at 23:23

## 2018-09-03 RX ADMIN — CEFTRIAXONE SCH MLS: 2 INJECTION, POWDER, FOR SOLUTION INTRAMUSCULAR; INTRAVENOUS at 20:22

## 2018-09-03 RX ADMIN — DOCUSATE CALCIUM SCH MG: 240 CAPSULE, LIQUID FILLED ORAL at 20:32

## 2018-09-03 RX ADMIN — DOCUSATE CALCIUM SCH MG: 240 CAPSULE, LIQUID FILLED ORAL at 09:05

## 2018-09-03 RX ADMIN — Medication SCH MG: at 09:06

## 2018-09-03 RX ADMIN — HEPARIN SODIUM SCH UNITS: 5000 INJECTION, SOLUTION INTRAVENOUS; SUBCUTANEOUS at 20:39

## 2018-09-03 RX ADMIN — Medication SCH ML: at 20:32

## 2018-09-03 RX ADMIN — Medication SCH ML: at 09:07

## 2018-09-03 NOTE — PDOC.PN
- Subjective


Encounter Start Date: 09/03/18


Encounter Start Time: 13:39


Subjective: no fever, some pain in surgical site





- Objective


MAR Reviewed: Yes


Vital Signs & Weight: 


 Vital Signs (12 hours)











  Temp Pulse Resp BP Pulse Ox


 


 09/03/18 11:42  98.2 F  99  20  131/71  97


 


 09/03/18 08:08  98.0 F  100  16  129/75  98


 


 09/03/18 08:00  98.0 F  100  16  


 


 09/03/18 04:18  98.1 F  86  17  126/70  97








 Weight











Weight                         200 lb














I&O: 


 











 09/02/18 09/03/18 09/04/18





 06:59 06:59 06:59


 


Intake Total 1335 680 


 


Output Total 375 350 


 


Balance 960 330 











Result Diagrams: 


 09/03/18 05:02





 09/03/18 05:02





Phys Exam





- Physical Examination


Neck: no JVD


Respiratory: clear to auscultation bilateral


Cardiovascular: no significant murmur, irregular


Gastrointestinal: soft, non-tender, positive bowel sounds


Musculoskeletal: no edema





Dx/Plan


(1) Atrial fibrillation with controlled ventricular response


Code(s): I48.91 - UNSPECIFIED ATRIAL FIBRILLATION   Status: Chronic   





(2) Hyponatremia


Code(s): E87.1 - HYPO-OSMOLALITY AND HYPONATREMIA   Status: Acute   





(3) HTN (hypertension)


Code(s): I10 - ESSENTIAL (PRIMARY) HYPERTENSION   Status: Chronic   


Qualifiers: 


   Hypertension type: essential hypertension   Qualified Code(s): I10 - 

Essential (primary) hypertension   





(4) Acute renal failure


Status: Acute   





- Plan


prerenal azotemia. cont iv fluids, add NS bolus, lab in am





* .

## 2018-09-04 LAB
ANION GAP SERPL CALC-SCNC: 13 MMOL/L (ref 10–20)
BASOPHILS # BLD AUTO: 0 THOU/UL (ref 0–0.2)
BASOPHILS NFR BLD AUTO: 0.3 % (ref 0–1)
BUN SERPL-MCNC: 26 MG/DL (ref 8.4–25.7)
CALCIUM SERPL-MCNC: 8.5 MG/DL (ref 7.8–10.44)
CHLORIDE SERPL-SCNC: 104 MMOL/L (ref 98–107)
CO2 SERPL-SCNC: 24 MMOL/L (ref 23–31)
CREAT CL PREDICTED SERPL C-G-VRATE: 47 ML/MIN (ref 70–130)
EOSINOPHIL # BLD AUTO: 0.7 THOU/UL (ref 0–0.7)
EOSINOPHIL NFR BLD AUTO: 10.1 % (ref 0–10)
GLUCOSE SERPL-MCNC: 94 MG/DL (ref 83–110)
HGB BLD-MCNC: 9.3 G/DL (ref 14–18)
LYMPHOCYTES # BLD: 0.8 THOU/UL (ref 1.2–3.4)
LYMPHOCYTES NFR BLD AUTO: 11.5 % (ref 21–51)
MCH RBC QN AUTO: 28.8 PG (ref 27–31)
MCV RBC AUTO: 91.3 FL (ref 78–98)
MONOCYTES # BLD AUTO: 0.6 THOU/UL (ref 0.11–0.59)
MONOCYTES NFR BLD AUTO: 8.9 % (ref 0–10)
NEUTROPHILS # BLD AUTO: 4.7 THOU/UL (ref 1.4–6.5)
NEUTROPHILS NFR BLD AUTO: 69.2 % (ref 42–75)
PLATELET # BLD AUTO: 454 THOU/UL (ref 130–400)
POTASSIUM SERPL-SCNC: 4.3 MMOL/L (ref 3.5–5.1)
RBC # BLD AUTO: 3.23 MILL/UL (ref 4.7–6.1)
SODIUM SERPL-SCNC: 137 MMOL/L (ref 136–145)
WBC # BLD AUTO: 6.8 THOU/UL (ref 4.8–10.8)

## 2018-09-04 RX ADMIN — DOCUSATE CALCIUM SCH MG: 240 CAPSULE, LIQUID FILLED ORAL at 08:30

## 2018-09-04 RX ADMIN — HEPARIN SODIUM SCH UNITS: 5000 INJECTION, SOLUTION INTRAVENOUS; SUBCUTANEOUS at 08:32

## 2018-09-04 RX ADMIN — Medication SCH MG: at 20:28

## 2018-09-04 RX ADMIN — HYDROCODONE BITARTRATE AND ACETAMINOPHEN PRN TAB: 10; 325 TABLET ORAL at 10:38

## 2018-09-04 RX ADMIN — HEPARIN SODIUM SCH UNITS: 5000 INJECTION, SOLUTION INTRAVENOUS; SUBCUTANEOUS at 20:44

## 2018-09-04 RX ADMIN — HYDROCODONE BITARTRATE AND ACETAMINOPHEN PRN TAB: 10; 325 TABLET ORAL at 18:32

## 2018-09-04 RX ADMIN — CEFTRIAXONE SCH MLS: 2 INJECTION, POWDER, FOR SOLUTION INTRAMUSCULAR; INTRAVENOUS at 20:27

## 2018-09-04 RX ADMIN — Medication SCH ML: at 20:55

## 2018-09-04 RX ADMIN — Medication SCH MG: at 08:30

## 2018-09-04 RX ADMIN — Medication SCH ML: at 10:35

## 2018-09-04 RX ADMIN — DOCUSATE CALCIUM SCH MG: 240 CAPSULE, LIQUID FILLED ORAL at 20:28

## 2018-09-05 LAB
ANION GAP SERPL CALC-SCNC: 11 MMOL/L (ref 10–20)
BASOPHILS # BLD AUTO: 0 THOU/UL (ref 0–0.2)
BASOPHILS NFR BLD AUTO: 0.2 % (ref 0–1)
BUN SERPL-MCNC: 24 MG/DL (ref 8.4–25.7)
CALCIUM SERPL-MCNC: 8.8 MG/DL (ref 7.8–10.44)
CHLORIDE SERPL-SCNC: 104 MMOL/L (ref 98–107)
CO2 SERPL-SCNC: 28 MMOL/L (ref 23–31)
CREAT CL PREDICTED SERPL C-G-VRATE: 44 ML/MIN (ref 70–130)
CRYSTAL-AUWI FLAG: 0 (ref 0–15)
EOSINOPHIL # BLD AUTO: 0.9 THOU/UL (ref 0–0.7)
EOSINOPHIL NFR BLD AUTO: 15.3 % (ref 0–10)
GLUCOSE SERPL-MCNC: 93 MG/DL (ref 83–110)
HEV IGM SER QL: 3.7 (ref 0–7.99)
HGB BLD-MCNC: 9.4 G/DL (ref 14–18)
HYALINE CASTS #/AREA URNS LPF: (no result) LPF
LYMPHOCYTES # BLD: 1.2 THOU/UL (ref 1.2–3.4)
LYMPHOCYTES NFR BLD AUTO: 19.3 % (ref 21–51)
MCH RBC QN AUTO: 29 PG (ref 27–31)
MCV RBC AUTO: 91.3 FL (ref 78–98)
MONOCYTES # BLD AUTO: 0.6 THOU/UL (ref 0.11–0.59)
MONOCYTES NFR BLD AUTO: 10.4 % (ref 0–10)
NEUTROPHILS # BLD AUTO: 3.3 THOU/UL (ref 1.4–6.5)
NEUTROPHILS NFR BLD AUTO: 54.7 % (ref 42–75)
PATHC CAST-AUWI FLAG: 1.59 (ref 0–2.49)
PLATELET # BLD AUTO: 438 THOU/UL (ref 130–400)
POTASSIUM SERPL-SCNC: 4.7 MMOL/L (ref 3.5–5.1)
RBC # BLD AUTO: 3.25 MILL/UL (ref 4.7–6.1)
RBC UR QL AUTO: (no result) HPF (ref 0–3)
SODIUM SERPL-SCNC: 138 MMOL/L (ref 136–145)
SP GR UR STRIP: 1.01 (ref 1–1.04)
SPERM-AUWI FLAG: 0 (ref 0–9.9)
WBC # BLD AUTO: 6.1 THOU/UL (ref 4.8–10.8)
WBC UR QL AUTO: (no result) HPF (ref 0–3)
YEAST-AUWI FLAG: 0 (ref 0–25)

## 2018-09-05 RX ADMIN — HEPARIN SODIUM SCH UNITS: 5000 INJECTION, SOLUTION INTRAVENOUS; SUBCUTANEOUS at 21:01

## 2018-09-05 RX ADMIN — DOCUSATE CALCIUM SCH MG: 240 CAPSULE, LIQUID FILLED ORAL at 20:57

## 2018-09-05 RX ADMIN — DOCUSATE CALCIUM SCH MG: 240 CAPSULE, LIQUID FILLED ORAL at 08:48

## 2018-09-05 RX ADMIN — HYDROCODONE BITARTRATE AND ACETAMINOPHEN PRN TAB: 10; 325 TABLET ORAL at 00:21

## 2018-09-05 RX ADMIN — HYDROCODONE BITARTRATE AND ACETAMINOPHEN PRN TAB: 10; 325 TABLET ORAL at 14:09

## 2018-09-05 RX ADMIN — Medication SCH ML: at 20:58

## 2018-09-05 RX ADMIN — CEFTRIAXONE SCH MLS: 2 INJECTION, POWDER, FOR SOLUTION INTRAMUSCULAR; INTRAVENOUS at 20:56

## 2018-09-05 RX ADMIN — Medication SCH ML: at 08:49

## 2018-09-05 RX ADMIN — Medication SCH MG: at 08:48

## 2018-09-05 RX ADMIN — HEPARIN SODIUM SCH UNITS: 5000 INJECTION, SOLUTION INTRAVENOUS; SUBCUTANEOUS at 08:49

## 2018-09-05 NOTE — PDOC.PN
- Subjective


Encounter Start Date: 09/05/18


Encounter Start Time: 11:13


Subjective: doing well





- Objective


MAR Reviewed: Yes


Vital Signs & Weight: 


 Vital Signs (12 hours)











  Temp Pulse Resp BP BP Pulse Ox


 


 09/05/18 08:00  97.6 F  81  18   158/76 H  92 L


 


 09/05/18 04:35  98 F  81  14   135/79  92 L


 


 09/05/18 00:17   81   171/88 H  


 


 09/05/18 00:00  98.1 F  81  16   171/88 H  92 L








 Weight











Weight                         200 lb














I&O: 


 











 09/04/18 09/05/18 09/06/18





 06:59 06:59 06:59


 


Intake Total 2200 1940 


 


Output Total 1400 2400 


 


Balance 800 -460 











Result Diagrams: 


 09/05/18 05:04





 09/05/18 05:04





Phys Exam





- Physical Examination


Neck: no JVD


Respiratory: clear to auscultation bilateral


Cardiovascular: RRR, no significant murmur


Gastrointestinal: soft, non-tender, positive bowel sounds


Musculoskeletal: no edema





Dx/Plan


(1) Atrial fibrillation with controlled ventricular response


Code(s): I48.91 - UNSPECIFIED ATRIAL FIBRILLATION   Status: Chronic   





(2) Hyponatremia


Code(s): E87.1 - HYPO-OSMOLALITY AND HYPONATREMIA   Status: Acute   





(3) HTN (hypertension)


Code(s): I10 - ESSENTIAL (PRIMARY) HYPERTENSION   Status: Chronic   


Qualifiers: 


   Hypertension type: essential hypertension   Qualified Code(s): I10 - 

Essential (primary) hypertension   





(4) Acute renal failure


Status: Acute   


Qualifiers: 


   Acute renal failure type: unspecified   Qualified Code(s): N17.9 - Acute 

kidney failure, unspecified   





- Plan


renal fcn steadily adverse, have called nephrology





* .

## 2018-09-05 NOTE — PRG
DATE OF SERVICE:  09/05/2018

 

SUBJECTIVE:  Jasper is now postoperative day #6 from a staged excisional right total knee arthroplasty r
evision.  His culture demonstrated Streptococcus anginosus group which was pansensitive.

 

PHYSICAL EXAMINATION: 

VITAL SIGNS:  Temperature 97.6, pulse 81, respiratory rate 18, O2 saturations are 92% on room air, bl
ood pressure 158/76.

GENERAL:  He is alert and oriented to person, place, time, and situation.  Grossly nonfocal appropria
te with examiner.

EXTREMITIES:  He is neurovascularly intact in both lower extremities.  Incision is clean and closed w
ithout erythema.

 

ASSESSMENT:  An 85-year-old male postop day #6 from an excisional staged total knee arthroplasty with
 Streptococcus anginosus pansensitive.

 

PLAN:  His creatinine has been climbing steadily and I spoke to his daughter about this and apparentl
y he was treated with compression hose [facility].  Dr. Webb has been notified on consult.  
I discussed the patient with Dr. Penny Chaudhary.  We will hold discharge for a day and plan for a Neph
rology evaluation prior to discharge.

## 2018-09-05 NOTE — ULT
RENAL ULTRASOUND:

 

Comparison: None. 

 

History: Renal failure with possible hydronephrosis. 

 

Technique: Multiplanar grayscale and color doppler images were obtained in a renal ultrasound. 

 

FINDINGS: 

The kidneys are normal in echogenicity without hydronephrosis or shadowing calculi. There are anechoi
c cysts in both kidneys. The largest is seen on the left measuring 1.9 cm in greatest dimension. The 
kidneys measure 11.3 and 11.7 cm in length on the right and left, respectively. 

 

Limited evaluation of the urinary bladder is unremarkable. 

 

IMPRESSION: 

Bilateral renal cysts. 

 

POS: H

## 2018-09-05 NOTE — CON
NEPHROLOGY CONSULTATION

 

DATE OF CONSULTATION:  09/05/2018

 

REASON FOR CONSULTATION:  Elevated creatinine.

 

HISTORY OF PRESENT ILLNESS:  This is a very pleasant 85-year-old gentleman who 
presented to the hospital on 08/31 for right total knee replacement infection.  
The patient had a baseline creatinine of 0.76, on 09/01, which increased to 1.2 
and is 1.5 today.  There is some progressive increasing creatinine.  The 
patient had a gram-positive cocci in clusters.  The patient had been started on 
ceftriaxone.

 

PAST MEDICAL HISTORY:  Hypertension, gout, DJD.

 

PAST SURGICAL HISTORY:  Knee implant and spacer placement.

 

HOME MEDICATIONS:  Reviewed.

 

HOSPITAL MEDICATIONS:  Reviewed.

 

ALLERGIES:  Reviewed.

 

FAMILY HISTORY:  Negative for ESRD.

 

REVIEW OF SYSTEMS:  A 15-point review of systems was performed and negative 
except all noted above.

GENERAL:  Weakness-

HEAD:  Headache-

NECK:  No swelling or lumps.

NOSE:  No epistaxis or discharge.

EYES:  No diplopia or pain.

RESPIRATORY:  Dyspnea-

CARDIOVASCULAR:  Chest pain-

GASTROINTESTINAL:  Nausea-

/GYN:  Hematuria-

MUSCULOSKELETAL:  No joint pain.

NEUROPSYCHIATIC SYSTEMS:  No suicidal ideation.  No ideation.

SKIN:  Denies any rash or ulcer.

CONSTITUTIONAL:  No fever or chills.

 

PHYSICAL EXAMINATION:

GENERAL:  Patient is awake, alert.

VITAL SIGNS:  Afebrile, pulse 81, breathing 16, blood pressure 135/79.

GENERAL APPEARANCE AND MENTAL STATUS:  Fair.

HEAD/NECK:  Normocephalic.  Atraumatic.

EYES:  EOMI.  No deformity.

EARS:  Clear.  No ulcers.

NOSE:  Intact.  No lesions.

MOUTH:  Clear.  No discharge.

THROAT:  Clear.  No exudate.

LUNGS:  Clear.  No crackles.

CARDIAC:  S1, S2.  No rub.

ABDOMEN:  Benign.  BS+.

GENITALIA/RECTUM:  Reddy absent.

BACK/EXTREMITIES:  Edema 0+ Ulcer-

NEUROLOGICAL:  Alert and motor intact.

SKIN:  Rash- Bruise-

LYMPHATICS:  Edema- Ulcer-

JOINT:  Per orthopedics.

 

LABORATORY DATA:  Shows urine is negative for protein and creatinine was 1.5, 
potassium 4.7.

 

ASSESSMENT AND RECOMMENDATIONS:

1.  Acute kidney injury with chronic kidney disease, most likely due to 
postinfectious glomerulonephritis versus drug-induced interstitial nephritis.  
Agree with hydration.  No indication for dialysis.

2.  Anemia, stable.

3.  Medication based on glomerular filtration rate are appropriate.

4. Renal imaging indicates bilateral renal cysts, which indicates chronic 
kidney disease.

 

MTDD

## 2018-09-06 VITALS — SYSTOLIC BLOOD PRESSURE: 151 MMHG | DIASTOLIC BLOOD PRESSURE: 67 MMHG | TEMPERATURE: 97.9 F

## 2018-09-06 LAB
ANION GAP SERPL CALC-SCNC: 13 MMOL/L (ref 10–20)
BUN SERPL-MCNC: 24 MG/DL (ref 8.4–25.7)
CALCIUM SERPL-MCNC: 8.8 MG/DL (ref 7.8–10.44)
CHLORIDE SERPL-SCNC: 102 MMOL/L (ref 98–107)
CO2 SERPL-SCNC: 26 MMOL/L (ref 23–31)
CREAT CL PREDICTED SERPL C-G-VRATE: 46 ML/MIN (ref 70–130)
GLUCOSE SERPL-MCNC: 94 MG/DL (ref 83–110)
POTASSIUM SERPL-SCNC: 4.2 MMOL/L (ref 3.5–5.1)
SODIUM SERPL-SCNC: 137 MMOL/L (ref 136–145)

## 2018-09-06 RX ADMIN — DOCUSATE CALCIUM SCH MG: 240 CAPSULE, LIQUID FILLED ORAL at 08:08

## 2018-09-06 RX ADMIN — HEPARIN SODIUM SCH UNITS: 5000 INJECTION, SOLUTION INTRAVENOUS; SUBCUTANEOUS at 08:08

## 2018-09-06 RX ADMIN — Medication SCH ML: at 08:09

## 2018-09-06 RX ADMIN — HYDROCODONE BITARTRATE AND ACETAMINOPHEN PRN TAB: 10; 325 TABLET ORAL at 01:11

## 2018-09-06 NOTE — PRG
DATE OF SERVICE:  09/06/2018

 

SUBJECTIVE:  This is an 85-year-old gentleman being seen for acute kidney injury.  The patient denies
 any nausea, vomiting, or chest pain.

 

PHYSICAL EXAMINATION:

GENERAL:  Patient is awake.

VITAL SIGNS:  Afebrile, pulse 74, breathing 16, blood pressure 153/77.  

 

OBJECTIVE:   See above.

Awake, alert, in no acute distress.

GENERAL APPEARANCE AND MENTAL STATUS:  Fair.

HEAD/NECK:  Normocephalic.   Atraumatic.

EYES:  EOMI.  No deformity.

EARS:  Clear.  No ulcers.

NOSE:   Intact.  No lesions.

MOUTH:  Clear.  No discharge.

THROAT:  Clear.  No exudate.

LUNGS:   Clear.  No crackles.

CARDIAC:   S1, S2.  No rub.

ABDOMEN:   Benign.  BS+.

GENITALIA/RECTUM:  Reddy absent.

BACK/EXTREMITIES:  Edema 0+ Ulcer-

NEUROLOGICAL:  Alert and motor intact.                     

SKIN:  Rash- Bruise-  

LYMPHATICS:  Edema- Ulcer-

 

LABORATORY:  Hemoglobin was 9.4, creatinine 1.5.

 

ASSESSMENT AND PLAN:

1.  Acute kidney injury with chronic kidney disease, most likely because of acute tubular necrosis du
e to sepsis and possible drug induced.  No indication for dialysis.  

2.  Hypertension, stable.

3.  Anemia, stable.

4.  Medication based on glomerular filtration rate are appropriate.  I would continue hydration.

 

The patient can follow up to see me in 1 week.

## 2018-09-06 NOTE — DIS
DATE OF ADMISSION:  08/30/2018

 

DATE OF DISCHARGE:  09/06/2018

 

PRIMARY CARE PROVIDE:  Jocelin Lyons M.D.

 

Discharged to rehabilitation.

 

DISCHARGE MEDICATIONS:  Eliquis 5 mg twice a day, colchicine 0.6 mg daily, vitamin C, omega 3 fish oi
l, Flomax 0.4 daily, atorvastatin 40 mg a day, enalapril 20 mg a day, omeprazole 20 mg a day, Norvasc
 10 mg a day, allopurinol 300 mg at bedtime, Rocephin 2 grams q.24 hours.

 

ALLERGIES:  None.

 

DIET:  Heart healthy.

 

PENDING AT THE TIME OF DISCHARGE:  Nothing.

 

FINAL DIAGNOSES:

1.  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

2.  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

3.  Atrial fibrillation with controlled ventricular response.

4.  Hypertension.

5.  Acute renal failure.

6.  History of anticoagulation being held.

7.  Dyslipidemia.

8.  Gout.

 

HOSPITAL COURSE:  The patient admitted with infected right knee, was seen by Dr. Reynaldo Holguin.  Micki
ent was on vancomycin and Rocephin pre-op.  Post-surgery a medical management consult with [facility] was 
initiated.  The patient's postop status was unremarkable until 09/03/2018, was noted to have an incre
ased creatinine of 142.  He was given fluids, follow up 09/04/2018 was 148.  Fluids were continued.  
Follow up, 09/05/2018 was 1.57.  Dr. Shaggy Webb was consulted.  Today, his creatinine has dropped to 1
.52.  Renal ultrasound was done showed bilateral renal cysts.  The patient is doing well today.  Diana
l signs are stable.  Cardiorespiratory exam is unremarkable except for his atrial fib.  He is being d
ischarged to rehab for continuing care.  Dr. Shaggy Webb, Nephrology will follow him there.  Post-rehab
ilitation he needs follow up with the operating physician, Dr. Boone and with Dr. Lyons.  Follow
 up in rehab will be done by Dr. Rider.

## 2024-01-29 NOTE — PDOC.PN
- Subjective


Encounter Start Date: 09/04/18


Encounter Start Time: 12:58


Subjective: constipated





- Objective


Vital Signs & Weight: 


 Vital Signs (12 hours)











  Temp Pulse Resp BP Pulse Ox


 


 09/04/18 11:52  97.2 F L  97  18  169/74 H  95


 


 09/04/18 08:00  98.2 F  97  16  


 


 09/04/18 07:26  98.2 F  97  16  156/84 H  95


 


 09/04/18 04:25  97.4 F L  87  19  148/77 H  95








 Weight











Weight                         200 lb














I&O: 


 











 09/03/18 09/04/18 09/05/18





 06:59 06:59 06:59


 


Intake Total 680 2200 


 


Output Total 350 1400 


 


Balance 330 800 











Result Diagrams: 


 09/03/18 05:02





 09/04/18 04:13





Phys Exam





- Physical Examination


Neck: no JVD


Respiratory: clear to auscultation bilateral


Cardiovascular: no significant murmur, irregular


Gastrointestinal: soft, non-tender, positive bowel sounds


Musculoskeletal: no edema





Dx/Plan


(1) Atrial fibrillation with controlled ventricular response


Code(s): I48.91 - UNSPECIFIED ATRIAL FIBRILLATION   Status: Chronic   





(2) Hyponatremia


Code(s): E87.1 - HYPO-OSMOLALITY AND HYPONATREMIA   Status: Acute   





(3) HTN (hypertension)


Code(s): I10 - ESSENTIAL (PRIMARY) HYPERTENSION   Status: Chronic   


Qualifiers: 


   Hypertension type: essential hypertension   Qualified Code(s): I10 - 

Essential (primary) hypertension   





(4) Acute renal failure


Status: Acute   





(5) Anemia, blood loss


Code(s): D50.0 - IRON DEFICIENCY ANEMIA SECONDARY TO BLOOD LOSS (CHRONIC)   

Status: Acute   





- Plan


rpt cbc now


-: bolus 1 li NS, bmp in am





* . Initiate Treatment: clindamycin 1.2 % (1 % base)-benzoyl peroxide 5 % topical gel TP\\nSig: Apply pea sized amount to full face once daily in the morning after Render In Strict Bullet Format?: No Detail Level: Zone Discontinue Regimen: doxycycline hyclate 100 mg capsule -Take one pill once daily with a meal and glass of water Continue Regimen: adapalene 0.3 % topical gel- Apply pea sized amount to entire face nightly. Follow by moisturizer